# Patient Record
Sex: MALE | Race: BLACK OR AFRICAN AMERICAN | NOT HISPANIC OR LATINO | URBAN - METROPOLITAN AREA
[De-identification: names, ages, dates, MRNs, and addresses within clinical notes are randomized per-mention and may not be internally consistent; named-entity substitution may affect disease eponyms.]

---

## 2018-07-08 ENCOUNTER — EMERGENCY (EMERGENCY)
Facility: HOSPITAL | Age: 28
LOS: 1 days | Discharge: HOME | End: 2018-07-08
Attending: EMERGENCY MEDICINE | Admitting: EMERGENCY MEDICINE
Payer: MEDICAID

## 2018-07-08 VITALS
TEMPERATURE: 98 F | SYSTOLIC BLOOD PRESSURE: 112 MMHG | RESPIRATION RATE: 16 BRPM | DIASTOLIC BLOOD PRESSURE: 72 MMHG | HEART RATE: 78 BPM

## 2018-07-08 LAB
HETEROPH AB TITR SER AGGL: NEGATIVE — SIGNIFICANT CHANGE UP
HIV 1+2 AB+HIV1 P24 AG SERPL QL IA: SIGNIFICANT CHANGE UP

## 2018-07-08 PROCEDURE — 99284 EMERGENCY DEPT VISIT MOD MDM: CPT

## 2018-07-08 RX ORDER — DEXAMETHASONE 0.5 MG/5ML
6 ELIXIR ORAL ONCE
Qty: 0 | Refills: 0 | Status: COMPLETED | OUTPATIENT
Start: 2018-07-08 | End: 2018-07-08

## 2018-07-08 RX ORDER — ACETAMINOPHEN 500 MG
650 TABLET ORAL ONCE
Qty: 0 | Refills: 0 | Status: COMPLETED | OUTPATIENT
Start: 2018-07-08 | End: 2018-07-08

## 2018-07-08 RX ORDER — PENICILLIN G BENZATHINE 1200000 [IU]/2ML
1.2 INJECTION, SUSPENSION INTRAMUSCULAR ONCE
Qty: 0 | Refills: 0 | Status: COMPLETED | OUTPATIENT
Start: 2018-07-08 | End: 2018-07-08

## 2018-07-08 RX ORDER — KETOROLAC TROMETHAMINE 30 MG/ML
30 SYRINGE (ML) INJECTION ONCE
Qty: 0 | Refills: 0 | Status: DISCONTINUED | OUTPATIENT
Start: 2018-07-08 | End: 2018-07-08

## 2018-07-08 RX ADMIN — Medication 650 MILLIGRAM(S): at 16:32

## 2018-07-08 RX ADMIN — Medication 6 MILLIGRAM(S): at 17:20

## 2018-07-08 RX ADMIN — PENICILLIN G BENZATHINE 1.2 MILLION UNIT(S): 1200000 INJECTION, SUSPENSION INTRAMUSCULAR at 17:20

## 2018-07-08 RX ADMIN — Medication 30 MILLIGRAM(S): at 16:32

## 2018-07-08 NOTE — ED ADULT NURSE NOTE - OBJECTIVE STATEMENT
pt evaluated by JORGE KELLOGG and sent to [facility]W. pt c.o. sore throat x 3 days. endorses fever. no drooling, no change in voice able to swallow saliva, no difficulty breathing. pt ambulatory in area. medicated as ordered. labs sent;. pt evaluated by JORGE KELLOGG and sent to [facility]. pt c.o. sore throat x 3 days. endorses fever. no drooling,  voice slightly muffled. able to swallow saliva, no difficulty breathing. pt ambulatory in area. medicated as ordered. labs sent;.

## 2018-07-08 NOTE — ED PROVIDER NOTE - CARE PLAN
Principal Discharge DX:	Pharyngitis  Assessment and plan of treatment:	Follow up with your primary care provider within 48 hours. Rest. Drink plenty of fluids. Return to ER for any new or worsening symptoms, fever, drooling, muffled voice, swelling, unable to swallow food or any other concerns.

## 2018-07-08 NOTE — ED PROVIDER NOTE - PROGRESS NOTE DETAILS
PA Gerasimou - b/l exudate and orpharynx with erythema. no PTA. tonsils symmetrical and small. will treat for strep pharyngitis. tolerating po. pcp follow up. pt amenable for dc

## 2018-07-08 NOTE — ED PROVIDER NOTE - PLAN OF CARE
Follow up with your primary care provider within 48 hours. Rest. Drink plenty of fluids. Return to ER for any new or worsening symptoms, fever, drooling, muffled voice, swelling, unable to swallow food or any other concerns.

## 2018-07-08 NOTE — ED PROVIDER NOTE - OBJECTIVE STATEMENT
27 y/o M w/ PMH asthma, presents to ED c/o sore throat x3days. Pt notes to having a subjective fever and headache over the past couple of days. Endorses use of Theraflu for pain relief. Denies any cough, abd pain, recent travel or any other days. NKDA

## 2018-07-10 LAB
S PYO SPEC QL CULT: SIGNIFICANT CHANGE UP
SPECIMEN SOURCE: SIGNIFICANT CHANGE UP

## 2018-07-11 NOTE — ED POST DISCHARGE NOTE - DETAILS
Pt treated in ED with Bicillin. I spoke with pt who states he is feeling much better. Agrees to follow up with PMD within the week. Strict return precautions discussed.
